# Patient Record
Sex: MALE | Race: WHITE
[De-identification: names, ages, dates, MRNs, and addresses within clinical notes are randomized per-mention and may not be internally consistent; named-entity substitution may affect disease eponyms.]

---

## 2019-12-26 ENCOUNTER — HOSPITAL ENCOUNTER (EMERGENCY)
Dept: HOSPITAL 97 - ER | Age: 24
Discharge: HOME | End: 2019-12-26
Payer: SELF-PAY

## 2019-12-26 VITALS — TEMPERATURE: 98.1 F

## 2019-12-26 VITALS — DIASTOLIC BLOOD PRESSURE: 74 MMHG | OXYGEN SATURATION: 99 % | SYSTOLIC BLOOD PRESSURE: 131 MMHG
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
## 2019-12-26 DIAGNOSIS — S61.112A: Primary | ICD-10-CM
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
## 2019-12-26 PROCEDURE — 0JQK0ZZ REPAIR LEFT HAND SUBCUTANEOUS TISSUE AND FASCIA, OPEN APPROACH: ICD-10-PCS

## 2019-12-26 PROCEDURE — 64450 NJX AA&/STRD OTHER PN/BRANCH: CPT

## 2019-12-26 PROCEDURE — 99283 EMERGENCY DEPT VISIT LOW MDM: CPT

## 2019-12-26 NOTE — ER
Nurse's Notes                                                                                     

 [facility]                                                                 

Name: Albaro Erwin                                                                                

Age: 24 yrs                                                                                       

Sex: Male                                                                                         

: 1995                                                                                   

MRN: [id number]                                                                                   

Arrival Date: 2019                                                                          

Time: 20:43                                                                                       

Account#: [id number]                                                                            

Bed 10                                                                                            

Private MD:                                                                                       

Diagnosis: Laceration without foreign body of left thumb with damage to nail                      

                                                                                                  

Presentation:                                                                                     

                                                                                             

20:48 Presenting complaint: Patient states: Laceration to tip of left thumb with kitchen      lp1 

      knife, not actively bleeding at this time. Transition of care: patient was not received     

      from another setting of care. Onset of symptoms was 2019. Risk Assessment:     

      Do you want to hurt yourself or someone else? Patient reports no desire to harm self or     

      others. Initial Sepsis Screen: Does the patient meet any 2 criteria? No. Patient's          

      initial sepsis screen is negative. Does the patient have a suspected source of              

      infection? No. Patient's initial sepsis screen is negative. Care prior to arrival: None.    

20:48 Method Of Arrival: Ambulatory                                                           lp1 

20:48 Acuity: JUSTINE 4                                                                           lp1 

                                                                                                  

Historical:                                                                                       

- Allergies:                                                                                      

20:49 No Known Allergies;                                                                     lp1 

- Home Meds:                                                                                      

20:49 None [Active];                                                                          lp1 

- PMHx:                                                                                           

20:49 None;                                                                                   lp1 

- PSHx:                                                                                           

20:49 Knee surgery;                                                                           lp1 

                                                                                                  

- Immunization history:: Adult Immunizations up to date, Last tetanus immunization: up            

  to date.                                                                                        

- Social history:: Smoking status: Patient/guardian denies using tobacco.                         

- Ebola Screening: : No symptoms or risks identified at this time.                                

                                                                                                  

                                                                                                  

Screenin:49 Abuse screen: Denies threats or abuse. Denies injuries from another. Nutritional        lp1 

      screening: No deficits noted. Tuberculosis screening: No symptoms or risk factors           

      identified. Fall Risk None identified.                                                      

                                                                                                  

Assessment:                                                                                       

20:40 Neuro: Level of Consciousness is awake, alert. Cardiovascular: Capillary refill < 3     rr5 

      seconds Patient's skin is warm and dry. Respiratory: Airway is patent Respiratory           

      effort is even, unlabored, Respiratory pattern is regular, symmetrical. GI: No signs        

      and/or symptoms were reported involving the gastrointestinal system. Derm: Skin is          

      healthy with good turgor, Skin temperature is warm. Musculoskeletal: Capillary refill <     

      3 seconds.                                                                                  

20:40 General: Appears in no apparent distress. comfortable, Behavior is calm, cooperative,   rr5 

      appropriate for age. : No signs and/or symptoms were reported regarding the               

      genitourinary system. EENT: No signs and/or symptoms were reported regarding the EENT       

      system. Derm: Wound noted thumb Wound is lacerated wound. Musculoskeletal: Circulation,     

      motion, and sensation intact.                                                               

20:40 Pain: Denies pain.                                                                      rr5 

21:20 Reassessment: Patient appears in no apparent distress at this time. No changes from     rr5 

      previously documented assessment. Patient is alert, oriented x 3, equal unlabored           

      respirations, skin warm/dry/pink.                                                           

22:31 Reassessment: Patient appears in no apparent distress at this time. Patient is alert,   rr5 

      oriented x 3, equal unlabored respirations, skin warm/dry/pink. discharge instruction       

      given and explained without complaints made.                                                

                                                                                                  

Vital Signs:                                                                                      

20:49  / 92; Pulse 81; Resp 18; Temp 98.1(O); Pulse Ox 97% on R/A; Weight 124.74 kg;    lp1 

      Height 6 ft. 0 in. (182.88 cm); Pain 0/10;                                                  

22:30  / 74; Pulse 75; Resp 17; Pulse Ox 99% ;                                          rr5 

20:49 Body Mass Index 37.30 (124.74 kg, 182.88 cm)                                            lp1 

                                                                                                  

ED Course:                                                                                        

20:43 Patient arrived in ED.                                                                  cl3 

20:48 Triage completed.                                                                       lp1 

20:48 Arm band placed on left wrist.                                                          lp1 

20:49 Patient has correct armband on for positive identification.                             lp1 

20:51 Kody Lam RN is Primary Nurse.                                                    rr5 

21:28 Akhil Aguirre MD is Attending Physician.                                             [facility] 

21:33 [facility]                                                        kb  

21:33 Akhil Aguirre MD is Attending Physician.                                             kb  

22:15 Assist provider with laceration repair on left hand that was 2.5 cm. or less using      rr5 

      sutures. Set up tray. Performed by Colleen NASH Dressed with 4X4s, Neosporin,      

      Patient tolerated well.                                                                     

22:31 Patient did not have IV access during this emergency room visit.                        rr5 

                                                                                                  

Administered Medications:                                                                         

21:42 Drug: Bupivacaine (0.5 %) 1 vials {Note: given by colleen alejo .} Volume: 10 ml; Route:   rr5 

      Infiltration;                                                                               

22:30 Follow up: Response: No adverse reaction                                                rr5 

21:42 Drug: Lidocaine (1 %) 1 vials {Note: given by colleen ALEJO.} Volume: 5 ml; Route:         rr5 

      Infiltration;                                                                               

22:30 Follow up: Response: No adverse reaction                                                rr5 

                                                                                                  

                                                                                                  

Outcome:                                                                                          

22:18 Discharge ordered by MD.                                                                garo  

22:31 Discharged to home ambulatory, with family.                                             rr5 

22:31 Condition: stable                                                                           

22:31 Discharge instructions given to patient, Instructed on discharge instructions, follow       

      up and referral plans. Demonstrated understanding of instructions, follow-up care.          

22:33 Patient left the ED.                                                                    rr5 

                                                                                                  

Signatures:                                                                                       

Colleen Adame, FNP-C                 FNP-Ckb                                                   

Akhil Aguirre MD MD cha Pena, Laura RN                         RN   lp1                                                  

Kody Lam RN                      RN   rr5                                                  

Mirela Epps                                cl3                                                  

                                                                                                  

Corrections: (The following items were deleted from the chart)                                    

21:03 20:40 General: Appears in no apparent distress. comfortable, Behavior is appropriate    rr5 

      for age, rr5                                                                                

21:03 20:40 Pain: Unable to use pain scale. FLACC scale score is 0 out of 10. rr5             rr5 

21:03 20:40 : No signs and/or symptoms were reported regarding the genitourinary system. [id number] 

21:03 20:40 EENT: Ear canal clear on left ear and right ear Nares with foreign body noted on  rr5 

      right white foreign body noted.. rr5                                                        

21:03 20:40 Derm: Skin is intact, Skin temperature is warm rr5                                rr5 

21:05 21:01 Musculoskeletal: Circulation, motion, and sensation intact. rr5                   rr5 

21:05 21:01 Derm: Wound noted thumb Wound is lacerated wound rr5                              rr5 

21:05 21:01 General: Appears in no apparent distress. uncomfortable, Behavior is calm,        rr5 

      cooperative, appropriate for age, rr5                                                       

21:05 21:01 Pain: Complains of pain in thumb Pain does not radiate. Pain Quality of pain is   rr5 

      described as aching, Pain began suddenly, Is continuous, rr5                                

21:05 21:01 : No signs and/or symptoms were reported regarding the genitourinary system. [id number] 

21:05 21:01 EENT: No signs and/or symptoms were reported regarding the EENT system. rr5       rr5 

22:33 22:31 No provider procedures requiring assistance completed. rr5                        rr5 

                                                                                                  

**************************************************************************************************

## 2019-12-26 NOTE — EDPHYS
Physician Documentation                                                                           

 [facility]                                                                 

Name: Albaro Erwin                                                                                

Age: 24 yrs                                                                                       

Sex: Male                                                                                         

: 1995                                                                                   

MRN: [id number]                                                                                   

Arrival Date: 2019                                                                          

Time: 20:43                                                                                       

Account#: [id number]                                                                            

Bed 10                                                                                            

Private MD:                                                                                       

ED Physician Akhil Aguirre                                                                      

HPI:                                                                                              

                                                                                             

21:44 This 24 yrs old  Male presents to ER via Ambulatory with complaints of         kb  

      Laceration To Thumb.                                                                        

21:44 The patient has a laceration related to: cooking, occurred at home, and there are no    kb  

      complicating factors. The injury was accidental. The laceration(s) is(are) located on       

      the dorsal aspect of distal phalanx of left thumb. Onset: The symptoms/episode              

      began/occurred just prior to arrival. Associated signs and symptoms: The patient has no     

      apparent associated signs or symptoms. The patient has not experienced similar symptoms     

      in the past. The patient has not recently seen a physician. Pt reports he was chopping      

      vegetables and accidentally cut his thumb.                                                  

                                                                                                  

Historical:                                                                                       

- Allergies:                                                                                      

20:49 No Known Allergies;                                                                     lp1 

- Home Meds:                                                                                      

20:49 None [Active];                                                                          lp1 

- PMHx:                                                                                           

20:49 None;                                                                                   lp1 

- PSHx:                                                                                           

20:49 Knee surgery;                                                                           lp1 

                                                                                                  

- Immunization history:: Adult Immunizations up to date, Last tetanus immunization: up            

  to date.                                                                                        

- Social history:: Smoking status: Patient/guardian denies using tobacco.                         

- Ebola Screening: : No symptoms or risks identified at this time.                                

                                                                                                  

                                                                                                  

ROS:                                                                                              

21:43 Constitutional: Negative for fever, chills, and weight loss, Neck: Negative for injury, kb  

      pain, and swelling, Cardiovascular: Negative for chest pain, palpitations, and edema,       

      Respiratory: Negative for shortness of breath, cough, wheezing, and pleuritic chest         

      pain, Abdomen/GI: Negative for abdominal pain, nausea, vomiting, diarrhea, and              

      constipation, Back: Negative for injury and pain, MS/Extremity: Negative for injury and     

      deformity, Neuro: Negative for headache, weakness, numbness, tingling, and seizure.         

21:43 Skin: Positive for laceration(s), of the dorsal aspect of distal phalanx of left thumb.     

                                                                                                  

Exam:                                                                                             

21:41 Constitutional:  This is a well developed, well nourished patient who is awake, alert,  kb  

      and in no acute distress. Head/Face:  Normocephalic, atraumatic. ENT:  Nares patent. No     

      nasal discharge, no septal abnormalities noted.  Tympanic membranes are normal and          

      external auditory canals are clear.  Oropharynx with no redness, swelling, or masses,       

      exudates, or evidence of obstruction, uvula midline.  Mucous membranes moist. Neck:         

      Trachea midline, no thyromegaly or masses palpated, and no cervical lymphadenopathy.        

      Supple, full range of motion without nuchal rigidity, or vertebral point tenderness.        

      No Meningismus. Chest/axilla:  Normal chest wall appearance and motion.  Nontender with     

      no deformity.  No lesions are appreciated. Cardiovascular:  Regular rate and rhythm         

      with a normal S1 and S2.  No gallops, murmurs, or rubs.  Normal PMI, no JVD.  No pulse      

      deficits. Respiratory:  Lungs have equal breath sounds bilaterally, clear to                

      auscultation and percussion.  No rales, rhonchi or wheezes noted.  No increased work of     

      breathing, no retractions or nasal flaring. Abdomen/GI:  Soft, non-tender, with normal      

      bowel sounds.  No distension or tympany.  No guarding or rebound.  No evidence of           

      tenderness throughout. MS/ Extremity:  Pulses equal, no cyanosis.  Neurovascular            

      intact.  Full, normal range of motion. Neuro:  Awake and alert, GCS 15, oriented to         

      person, place, time, and situation.  Cranial nerves II-XII grossly intact.  Motor           

      strength 5/5 in all extremities.  Sensory grossly intact.  Cerebellar exam normal.          

      Normal gait.                                                                                

21:41 Skin: injury, laceration(s), the wound is approximately  2 cm(s), of the dorsal aspect      

      of distal phalanx of left thumb, that can be described as clean, no foreign body,           

      linear, without bleeding.                                                                   

                                                                                                  

Vital Signs:                                                                                      

20:49  / 92; Pulse 81; Resp 18; Temp 98.1(O); Pulse Ox 97% on R/A; Weight 124.74 kg;    lp1 

      Height 6 ft. 0 in. (182.88 cm); Pain 0/10;                                                  

22:30  / 74; Pulse 75; Resp 17; Pulse Ox 99% ;                                          rr5 

20:49 Body Mass Index 37.30 (124.74 kg, 182.88 cm)                                            lp1 

                                                                                                  

Procedures:                                                                                       

21:42 Nerve block: (digital) of dorsal aspect of distal phalanx of left thumb Medication:     kb  

      Lidocaine 1% without epinephrine Marcaine 0.5%, Amount: 6 mls were injected, Effect:        

      the patient has resolution of the pain, Set up for procedure. Performed by Colleen NASH Patient tolerated well.                                                       

                                                                                                  

Laceration:                                                                                       

22:17 Wound Repair of 2cm ( 0.8in ) subcutaneous laceration to dorsal aspect of distal        kb  

      phalanx of left thumb. Linear shaped.. Distal neuro/vascular/tendon intact. Anesthesia:     

      Digital block administered with 1% lidocaine. Wound prep: Extensive cleansing with          

      hibiclenz by me, Wound irrigation with saline by me. Skin closed with 5 5-0 Prolene         

      using simple sutures and sterile technique. Dressed with Neosporin. Patient tolerated       

      well.                                                                                       

                                                                                                  

MDM:                                                                                              

21:33 Patient medically screened.                                                             kb  

21:42 Data reviewed: vital signs, nurses notes. Data interpreted: Pulse oximetry: on room air kb  

      is 97 %. Interpretation: normal. Counseling: I had a detailed discussion with the           

      patient and/or guardian regarding: the historical points, exam findings, and any            

      diagnostic results supporting the discharge/admit diagnosis, the need for outpatient        

      follow up, a family practitioner, to return to the emergency department if symptoms         

      worsen or persist or if there are any questions or concerns that arise at home.             

                                                                                                  

                                                                                             

21:33 Order name: Prolene, Sutures; Complete Time: 22:30                                      kb  

                                                                                             

21:33 Order name: Dressing - Wound; Complete Time: 22:30                                      kb  

                                                                                             

21:33 Order name: Gloves, Sterile; Complete Time: 22:30                                       kb  

                                                                                             

21:33 Order name: Setup Suture Tray; Complete Time: 22:30                                     kb  

                                                                                                  

Administered Medications:                                                                         

21:42 Drug: Bupivacaine (0.5 %) 1 vials {Note: given by colleen gibbs .} Volume: 10 ml; Route:   rr5 

      Infiltration;                                                                               

22:30 Follow up: Response: No adverse reaction                                                rr5 

21:42 Drug: Lidocaine (1 %) 1 vials {Note: given by colleen PEDERSEN} Volume: 5 ml; Route:         rr5 

      Infiltration;                                                                               

22:30 Follow up: Response: No adverse reaction                                                rr5 

                                                                                                  

                                                                                                  

Disposition:                                                                                      

                                                                                             

05:48 Co-signature as Attending Physician, Akhil Aguirre MD I agree with the assessment and  jamil 

      plan of care.                                                                               

                                                                                                  

Disposition:                                                                                      

19 22:18 Discharged to Home. Impression: Laceration without foreign body of left thumb      

  with damage to nail.                                                                            

- Condition is Stable.                                                                            

- Discharge Instructions: Laceration Care, Adult, Easy-to-Read.                                   

                                                                                                  

- Medication Reconciliation Form, Thank You Letter, Antibiotic Education, Prescription            

  Opioid Use, Work release form form.                                                             

- Follow up: Emergency Department; When: As needed; Reason: Worsening of condition.               

  Follow up: Private Physician; When: 2 - 3 days; Reason: Recheck today's complaints,             

  Continuance of care, Re-evaluation by your physician.                                           

                                                                                                  

                                                                                                  

                                                                                                  

Signatures:                                                                                       

Colleen Adame, ERINP-C                 FNP-CkAkhil Omalley MD MD cha Pena, Laura, RN                         RN   lp1                                                  

Kody Lam RN                      RN   rr5                                                  

                                                                                                  

Corrections: (The following items were deleted from the chart)                                    

                                                                                             

22:33 22:18 2019 22:18 Discharged to Home. Impression: Laceration without foreign body  rr5 

      of left thumb with damage to nail. Condition is Stable. Discharge Instructions:             

      Laceration Care, Adult, Easy-to-Read. Forms are Medication Reconciliation Form, Thank       

      You Letter, Antibiotic Education, Prescription Opioid Use. Follow up: Emergency             

      Department; When: As needed; Reason: Worsening of condition. Follow up: Private             

      Physician; When: 2 - 3 days; Reason: Recheck today's complaints, Continuance of care,       

      Re-evaluation by your physician. kb                                                         

                                                                                                  

**************************************************************************************************